# Patient Record
Sex: MALE | Race: BLACK OR AFRICAN AMERICAN | NOT HISPANIC OR LATINO | URBAN - METROPOLITAN AREA
[De-identification: names, ages, dates, MRNs, and addresses within clinical notes are randomized per-mention and may not be internally consistent; named-entity substitution may affect disease eponyms.]

---

## 2024-10-06 ENCOUNTER — OFFICE VISIT (OUTPATIENT)
Dept: URGENT CARE | Age: 34
End: 2024-10-06
Payer: COMMERCIAL

## 2024-10-06 VITALS
HEART RATE: 64 BPM | BODY MASS INDEX: 31.66 KG/M2 | TEMPERATURE: 98 F | SYSTOLIC BLOOD PRESSURE: 121 MMHG | WEIGHT: 221.12 LBS | OXYGEN SATURATION: 97 % | HEIGHT: 70 IN | RESPIRATION RATE: 18 BRPM | DIASTOLIC BLOOD PRESSURE: 83 MMHG

## 2024-10-06 DIAGNOSIS — J06.9 ACUTE URI: ICD-10-CM

## 2024-10-06 DIAGNOSIS — R05.1 ACUTE COUGH: ICD-10-CM

## 2024-10-06 DIAGNOSIS — B00.1 COLD SORE: Primary | ICD-10-CM

## 2024-10-06 PROCEDURE — 99203 OFFICE O/P NEW LOW 30 MIN: CPT | Performed by: PHYSICIAN ASSISTANT

## 2024-10-06 PROCEDURE — 3008F BODY MASS INDEX DOCD: CPT | Performed by: PHYSICIAN ASSISTANT

## 2024-10-06 RX ORDER — VALACYCLOVIR HYDROCHLORIDE 1 G/1
1000 TABLET, FILM COATED ORAL DAILY
Qty: 5 TABLET | Refills: 0 | Status: SHIPPED | OUTPATIENT
Start: 2024-10-06 | End: 2024-10-11

## 2024-10-06 RX ORDER — BROMPHENIRAMINE MALEATE, PSEUDOEPHEDRINE HYDROCHLORIDE, AND DEXTROMETHORPHAN HYDROBROMIDE 2; 30; 10 MG/5ML; MG/5ML; MG/5ML
10 SYRUP ORAL 4 TIMES DAILY PRN
Qty: 250 ML | Refills: 0 | Status: SHIPPED | OUTPATIENT
Start: 2024-10-06

## 2024-10-06 RX ORDER — BENZONATATE 200 MG/1
200 CAPSULE ORAL 3 TIMES DAILY PRN
Qty: 30 CAPSULE | Refills: 0 | Status: SHIPPED | OUTPATIENT
Start: 2024-10-06

## 2024-10-06 ASSESSMENT — PAIN SCALES - GENERAL: PAINLEVEL: 5

## 2024-10-06 NOTE — PROGRESS NOTES
"Subjective   Patient ID: Severino Saxena is a 34 y.o. male. They present today with a chief complaint of Mouth Lesions (Cold sore 1.5 days).    History of Present Illness  Cold sore outside right lower lip x 1 day    Nasal congestion/rhinorrhea, chills, fever, productive cough (worse laying), sore throat (am only) Friday night.    Denies sweats, ear pain, chest pain, SOB    Home covid negative.       Mouth Lesions  Associated symptoms: congestion, fever, rhinorrhea and sore throat    Associated symptoms: no ear pain        Past Medical History  Allergies as of 10/06/2024    (No Known Allergies)       (Not in a hospital admission)       History reviewed. No pertinent past medical history.    History reviewed. No pertinent surgical history.         Review of Systems  Review of Systems   Constitutional:  Positive for chills and fever. Negative for diaphoresis.   HENT:  Positive for congestion, mouth sores, rhinorrhea, sinus pressure and sore throat. Negative for ear pain.    Respiratory:  Positive for cough. Negative for shortness of breath.    Cardiovascular:  Negative for chest pain.   All other systems reviewed and are negative.                                 Objective    Vitals:    10/06/24 1458   BP: 121/83   BP Location: Left arm   Patient Position: Sitting   BP Cuff Size: Adult   Pulse: 64   Resp: 18   Temp: 36.7 °C (98 °F)   SpO2: 97%   Weight: 100 kg (221 lb 1.9 oz)   Height: 1.765 m (5' 9.5\")     No LMP for male patient.    Physical Exam  Vitals and nursing note reviewed.   Constitutional:       General: He is not in acute distress.  HENT:      Right Ear: Tympanic membrane normal.      Left Ear: Tympanic membrane normal.      Nose: Congestion and rhinorrhea present.      Mouth/Throat:      Pharynx: No oropharyngeal exudate or posterior oropharyngeal erythema.   Cardiovascular:      Rate and Rhythm: Normal rate and regular rhythm.      Heart sounds: Normal heart sounds.   Pulmonary:      Effort: Pulmonary " effort is normal.      Breath sounds: Normal breath sounds.   Neurological:      Mental Status: He is alert.         Procedures    Point of Care Test & Imaging Results from this visit  No results found for this visit on 10/06/24.   No results found.    Diagnostic study results (if any) were reviewed by Vida Ledezma PA-C.    Assessment/Plan   Allergies, medications, history, and pertinent labs/EKGs/Imaging reviewed by Vida Ledezma PA-C.     Orders and Diagnoses  There are no diagnoses linked to this encounter.    Medical Admin Record      Patient disposition: Home    Electronically signed by Vida Ledezma PA-C  3:48 PM

## 2024-10-06 NOTE — PATIENT INSTRUCTIONS
Alternate ibuprofen, 800mg max (with food), and tylenol 1000mg max, every 4 hours for discomfort/fever.     Use saline nasal spray     take antihistamine during the day, ex zyrtec, claritin, etc.     take decongestant bromfed    follow up with primary care if no improvement in 3 - 4 days.    take benzonatate for cough.

## 2024-10-08 ASSESSMENT — ENCOUNTER SYMPTOMS
DIAPHORESIS: 0
CHILLS: 1
FEVER: 1
SHORTNESS OF BREATH: 0
COUGH: 1
RHINORRHEA: 1
SINUS PRESSURE: 1
SORE THROAT: 1

## 2025-02-12 ENCOUNTER — OFFICE VISIT (OUTPATIENT)
Dept: URGENT CARE | Age: 35
End: 2025-02-12
Payer: COMMERCIAL

## 2025-02-12 VITALS
BODY MASS INDEX: 32.29 KG/M2 | HEART RATE: 81 BPM | HEIGHT: 69 IN | OXYGEN SATURATION: 98 % | SYSTOLIC BLOOD PRESSURE: 137 MMHG | WEIGHT: 218 LBS | TEMPERATURE: 97.2 F | DIASTOLIC BLOOD PRESSURE: 77 MMHG

## 2025-02-12 DIAGNOSIS — I10 HYPERTENSION, UNSPECIFIED TYPE: Primary | ICD-10-CM

## 2025-02-12 PROCEDURE — 3078F DIAST BP <80 MM HG: CPT | Performed by: PHYSICIAN ASSISTANT

## 2025-02-12 PROCEDURE — 3008F BODY MASS INDEX DOCD: CPT | Performed by: PHYSICIAN ASSISTANT

## 2025-02-12 PROCEDURE — 99213 OFFICE O/P EST LOW 20 MIN: CPT | Performed by: PHYSICIAN ASSISTANT

## 2025-02-12 PROCEDURE — 3075F SYST BP GE 130 - 139MM HG: CPT | Performed by: PHYSICIAN ASSISTANT

## 2025-02-12 RX ORDER — HYDROCHLOROTHIAZIDE 25 MG/1
12.5 TABLET ORAL DAILY
Qty: 14 TABLET | Refills: 0 | Status: SHIPPED | OUTPATIENT
Start: 2025-02-12

## 2025-02-12 NOTE — PROGRESS NOTES
"Subjective   Patient ID: Severino Saxena is a 34 y.o. male. They present today with a chief complaint of Other (Worried about BP/Hands and right arm are sore he thinks they're connected ).    History of Present Illness  Took blood pressure at home 200/111, has had several blood pressure systolic > 170.     Denies fever, chills, sweats, nausea, vomiting, abd pain, dizziness/lightheaded, chest pain, SOB, headache, change in vision.     PCP, at [facility].     dx of HTN in his early 20s, no hx of HTN meds.           Past Medical History  Allergies as of 02/12/2025    (No Known Allergies)       (Not in a hospital admission)       History reviewed. No pertinent past medical history.    History reviewed. No pertinent surgical history.     reports that he has never smoked. He has never used smokeless tobacco.    Review of Systems  Review of Systems                               Objective    Vitals:    02/12/25 1251   BP: 137/77   Pulse: 81   Temp: 36.2 °C (97.2 °F)   TempSrc: Temporal   SpO2: 98%   Weight: 98.9 kg (218 lb)   Height: 1.753 m (5' 9\")     No LMP for male patient.    Physical Exam    Procedures    Point of Care Test & Imaging Results from this visit  No results found for this visit on 02/12/25.   No results found.    Diagnostic study results (if any) were reviewed by Vida Ledezma PA-C.    Assessment/Plan   Allergies, medications, history, and pertinent labs/EKGs/Imaging reviewed by Vida Ledezma PA-C.     Medical Decision Making  ***    Orders and Diagnoses  There are no diagnoses linked to this encounter.    Medical Admin Record      Patient disposition: { Disposition:[id number]}    Electronically signed by Vida Ledezma PA-C  1:32 PM      " this visit  No results found for this visit on 02/12/25.   No results found.    Diagnostic study results (if any) were reviewed by Vida Ledezma PA-C.    Assessment/Plan   Allergies, medications, history, and pertinent labs/EKGs/Imaging reviewed by Vida Ledezma PA-C.     Orders and Diagnoses  There are no diagnoses linked to this encounter.    Medical Admin Record      Patient disposition: Home    Electronically signed by Vida Ledezma PA-C  1:32 PM

## 2025-02-12 NOTE — PATIENT INSTRUCTIONS
Take blood pressure at home twice a day, record follow up with your primary care at [facility].    If blood pressure >200/100 go to emergency department or any concerns.     Start hydrochlorothiazide, take in the morning.

## 2025-02-17 ASSESSMENT — ENCOUNTER SYMPTOMS
WEAKNESS: 0
DIAPHORESIS: 0
VOMITING: 0
FEVER: 0
ABDOMINAL PAIN: 0
LIGHT-HEADEDNESS: 0
CHILLS: 0
SHORTNESS OF BREATH: 0
DIZZINESS: 0
NAUSEA: 0